# Patient Record
Sex: FEMALE | Race: WHITE | NOT HISPANIC OR LATINO | Employment: PART TIME | URBAN - METROPOLITAN AREA
[De-identification: names, ages, dates, MRNs, and addresses within clinical notes are randomized per-mention and may not be internally consistent; named-entity substitution may affect disease eponyms.]

---

## 2022-07-23 ENCOUNTER — HOSPITAL ENCOUNTER (EMERGENCY)
Facility: HOSPITAL | Age: 22
Discharge: HOME | End: 2022-07-24
Attending: EMERGENCY MEDICINE
Payer: OTHER MISCELLANEOUS

## 2022-07-23 VITALS
BODY MASS INDEX: 17.27 KG/M2 | SYSTOLIC BLOOD PRESSURE: 128 MMHG | TEMPERATURE: 98.7 F | HEIGHT: 67 IN | OXYGEN SATURATION: 99 % | HEART RATE: 69 BPM | DIASTOLIC BLOOD PRESSURE: 68 MMHG | WEIGHT: 110 LBS | RESPIRATION RATE: 16 BRPM

## 2022-07-23 DIAGNOSIS — S51.859A CAT BITE OF FOREARM: Primary | ICD-10-CM
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
## 2022-07-23 PROCEDURE — 99283 EMERGENCY DEPT VISIT LOW MDM: CPT

## 2022-07-23 PROCEDURE — 99283 EMERGENCY DEPT VISIT LOW MDM: CPT | Performed by: EMERGENCY MEDICINE

## 2022-07-23 RX ORDER — DOXYCYCLINE HYCLATE 100 MG/1
100 CAPSULE ORAL ONCE
Status: COMPLETED | OUTPATIENT
Start: 2022-07-24 | End: 2022-07-24

## 2022-07-23 RX ORDER — DOXYCYCLINE HYCLATE 100 MG/1
100 CAPSULE ORAL 2 TIMES DAILY
Qty: 6 CAPSULE | Refills: 0 | Status: SHIPPED | OUTPATIENT
Start: 2022-07-23 | End: 2022-07-26

## 2022-07-23 RX ORDER — AMOXICILLIN AND CLAVULANATE POTASSIUM 875; 125 MG/1; MG/1
1 TABLET, FILM COATED ORAL ONCE
Status: DISCONTINUED | OUTPATIENT
Start: 2022-07-24 | End: 2022-07-23

## 2022-07-24 RX ADMIN — DOXYCYCLINE 100 MG: 100 CAPSULE ORAL at 00:00

## 2022-07-24 NOTE — DISCHARGE INSTRUCTIONS
Please observe the animal for the next 10 days if develops signs of illness please return to the emergency department as you will  likelyneed rabies prophylaxis     You already on doxycycline which is adequate coverage for prophylaxis for an animal bite please continue taking doxycycline, twice daily for 3-5 days     Please follow-up with the primary care provider if symptoms worsen please return to the emergency department

## 2022-07-24 NOTE — ED PROVIDER NOTES
History  Chief Complaint   Patient presents with    Cat Bite     Bit by cat at work that is not UTD on vaccines, L wrist      61-year-old previously healthy female on today to on vaccinations including tetanus approximately 1 month ago presents for evaluation of cat bite to the left forearm that happened earlier this evening  The cat is at their veterinary clinic for urinary procedure and will be there for the next 10 days  Has been acting normally and bit the patient when she was getting her nails groomed  Cat is not up-to-date on her rabies vaccinations however after discussion with the patient patient would rather observe the cat for the next 10 days and hold off on getting rabies series currently  She did clean the forearm extensively with water and iodine after the bite happened  Currently has some pain at the site otherwise no discharge, no bleeding  She is not immunocompromised  She is on doxycycline 100 mg daily for acne  None       History reviewed  No pertinent past medical history  History reviewed  No pertinent surgical history  History reviewed  No pertinent family history  I have reviewed and agree with the history as documented  E-Cigarette/Vaping    E-Cigarette Use Never User      E-Cigarette/Vaping Substances     Social History     Tobacco Use    Smoking status: Never Smoker    Smokeless tobacco: Never Used   Vaping Use    Vaping Use: Never used   Substance Use Topics    Drug use: Never       Review of Systems   Constitutional: Negative for chills and fever  HENT: Negative for rhinorrhea and sore throat  Respiratory: Negative for cough  Cardiovascular: Negative for chest pain and palpitations  Gastrointestinal: Negative for abdominal pain, nausea and vomiting  Genitourinary: Negative for dysuria, frequency and urgency  Skin: Positive for wound  Neurological: Negative for weakness, light-headedness and headaches         Physical Exam  Physical Exam  Vitals and nursing note reviewed  Constitutional:       Appearance: She is well-developed  HENT:      Head: Normocephalic and atraumatic  Cardiovascular:      Rate and Rhythm: Normal rate and regular rhythm  Heart sounds: No murmur heard  No friction rub  No gallop  Pulmonary:      Effort: Pulmonary effort is normal       Breath sounds: No wheezing or rales  Chest:      Chest wall: No tenderness  Abdominal:      General: There is no distension  Palpations: Abdomen is soft  There is no mass  Tenderness: There is no guarding or rebound  Skin:     General: Skin is warm and dry  Comments: Puncture wounds to left forearm, no active bleeding, neurovascularly intact   Neurological:      Mental Status: She is alert and oriented to person, place, and time           Vital Signs  ED Triage Vitals [07/23/22 2216]   Temperature Pulse Respirations Blood Pressure SpO2   98 7 °F (37 1 °C) 69 16 128/68 99 %      Temp Source Heart Rate Source Patient Position - Orthostatic VS BP Location FiO2 (%)   Oral Monitor Sitting Right arm --      Pain Score       4           Vitals:    07/23/22 2216   BP: 128/68   Pulse: 69   Patient Position - Orthostatic VS: Sitting         Visual Acuity      ED Medications  Medications   doxycycline hyclate (VIBRAMYCIN) capsule 100 mg (has no administration in time range)       Diagnostic Studies  Results Reviewed     None                 No orders to display              Procedures  Procedures         ED Course                                             MDM  Number of Diagnoses or Management Options  Cat bite of forearm  Diagnosis management comments:  28-year-old female with cat bite to left forearm, area cleaned extensively no active bleeding, will give enough doxycycline for 3 days of b i d  dosage patient will follow-up with PCP for further care      Disposition  Final diagnoses:   Cat bite of forearm     Time reflects when diagnosis was documented in both MDM as applicable and the Disposition within this note     Time User Action Codes Description Comment    7/23/2022 11:48 PM Eddie Ogden [[id number],  [id number]] Cat bite of forearm       ED Disposition     ED Disposition   Discharge    Condition   Stable    Date/Time   Sat Jul 23, 2022 11:47 PM    Comment   Dana Elam discharge to home/self care  Follow-up Information     Follow up With Specialties Details Why Contact Info Additional Information     [address] Emergency Department Emergency Medicine  If symptoms worsen [address] [place], [phone]  [address] [facility] Emergency Department, [address] [facility] [address]          Patient's Medications   Discharge Prescriptions    DOXYCYCLINE HYCLATE (VIBRAMYCIN) 100 MG CAPSULE    Take 1 capsule (100 mg total) by mouth 2 (two) times a day for 3 days       Start Date: 7/23/2022 End Date: 7/26/2022       Order Dose: 100 mg       Quantity: 6 capsule    Refills: 0       No discharge procedures on file      PDMP Review     None          ED Provider  Electronically Signed by           Leticia Rodriguez DO  07/23/22 3346